# Patient Record
Sex: MALE | Race: WHITE | NOT HISPANIC OR LATINO
[De-identification: names, ages, dates, MRNs, and addresses within clinical notes are randomized per-mention and may not be internally consistent; named-entity substitution may affect disease eponyms.]

---

## 2022-06-18 RX ORDER — MELOXICAM 7.5 MG/1
TABLET ORAL
COMMUNITY

## 2022-06-18 RX ORDER — AMLODIPINE BESYLATE 5 MG/1
TABLET ORAL
COMMUNITY

## 2022-06-18 RX ORDER — ALLOPURINOL 100 MG/1
TABLET ORAL
COMMUNITY

## 2022-06-18 RX ORDER — TRIAMCINOLONE ACETONIDE 1 MG/G
CREAM TOPICAL
COMMUNITY
Start: 2013-02-19

## 2022-06-18 RX ORDER — EZETIMIBE AND SIMVASTATIN 10; 20 MG/1; MG/1
TABLET ORAL
COMMUNITY

## 2022-06-18 RX ORDER — OMEPRAZOLE 20 MG/1
1 CAPSULE, DELAYED RELEASE ORAL
COMMUNITY

## 2022-06-18 RX ORDER — OSELTAMIVIR PHOSPHATE 75 MG/1
1 CAPSULE ORAL
COMMUNITY
Start: 2019-01-29

## 2023-05-12 PROBLEM — Z00.00 ENCOUNTER FOR PREVENTIVE HEALTH EXAMINATION: Status: ACTIVE | Noted: 2023-05-12

## 2023-05-16 ENCOUNTER — NON-APPOINTMENT (OUTPATIENT)
Age: 78
End: 2023-05-16

## 2023-05-16 ENCOUNTER — APPOINTMENT (OUTPATIENT)
Dept: CARDIOLOGY | Facility: CLINIC | Age: 78
End: 2023-05-16
Payer: MEDICARE

## 2023-05-16 VITALS
DIASTOLIC BLOOD PRESSURE: 78 MMHG | HEART RATE: 67 BPM | BODY MASS INDEX: 25.2 KG/M2 | HEIGHT: 71 IN | SYSTOLIC BLOOD PRESSURE: 124 MMHG | WEIGHT: 180 LBS | OXYGEN SATURATION: 98 %

## 2023-05-16 DIAGNOSIS — Z78.9 OTHER SPECIFIED HEALTH STATUS: ICD-10-CM

## 2023-05-16 DIAGNOSIS — Z87.891 PERSONAL HISTORY OF NICOTINE DEPENDENCE: ICD-10-CM

## 2023-05-16 DIAGNOSIS — Z98.890 OTHER SPECIFIED POSTPROCEDURAL STATES: ICD-10-CM

## 2023-05-16 PROCEDURE — 93000 ELECTROCARDIOGRAM COMPLETE: CPT

## 2023-05-16 PROCEDURE — 99203 OFFICE O/P NEW LOW 30 MIN: CPT

## 2023-05-16 RX ORDER — ALLOPURINOL 100 MG/1
100 TABLET ORAL DAILY
Refills: 0 | Status: ACTIVE | COMMUNITY

## 2023-05-16 RX ORDER — DRONEDARONE 400 MG/1
400 TABLET, FILM COATED ORAL
Qty: 180 | Refills: 3 | Status: COMPLETED | COMMUNITY
End: 2023-05-16

## 2023-05-16 RX ORDER — ROSUVASTATIN CALCIUM 40 MG/1
40 TABLET, FILM COATED ORAL DAILY
Qty: 90 | Refills: 3 | Status: ACTIVE | COMMUNITY

## 2023-05-16 RX ORDER — ENALAPRIL MALEATE 10 MG/1
10 TABLET ORAL DAILY
Refills: 0 | Status: ACTIVE | COMMUNITY

## 2023-05-16 RX ORDER — AMLODIPINE BESYLATE 5 MG/1
5 TABLET ORAL
Qty: 90 | Refills: 3 | Status: ACTIVE | COMMUNITY

## 2023-05-16 RX ORDER — PANTOPRAZOLE SODIUM 40 MG/1
40 TABLET, DELAYED RELEASE ORAL
Qty: 90 | Refills: 3 | Status: ACTIVE | COMMUNITY

## 2023-05-17 NOTE — CARDIOLOGY SUMMARY
[de-identified] : 12/29/2022 atrial fibrillation with controlled VR, LAHB, PRWP, QTc 474 ms\par 5/16/2023  atrial fibrillation with controlled VR, LAHB, PRWP, cannot rule out AMI, voltage criteria for LVH [de-identified] : Adenosine MPI 7/24/2008 was normal without scintigraphic evidence of ischemia or infarction.  [de-identified] : 1/18/2006 Calcium score 223 with focus of calcified and soft plaque in the proximal LAD, approximately 50 to 70% stenosis, possible overestimation of severity due to calcification in this plaque. LAD and LCx noted to originate from a short common trunk  15 to 30% calcified stenoses noted in co-dominate LCx and proximal RCA\par \par Calcium score 223

## 2023-05-17 NOTE — REASON FOR VISIT
[FreeTextEntry3] : Doroteo Montesinos MD [FreeTextEntry1] : Ongoing cardiac care for atrial fibrillation

## 2023-05-17 NOTE — REVIEW OF SYSTEMS
[Fever] : no fever [Chills] : no chills [SOB] : no shortness of breath [Chest Discomfort] : no chest discomfort [Lower Ext Edema] : lower extremity edema [Palpitations] : no palpitations [Leg Claudication] : no intermittent leg claudication [Orthopnea] : no orthopnea [Syncope] : no syncope [Cough] : no cough [Wheezing] : no wheezing [Dizziness] : no dizziness [Weakness] : no weakness [Limb Weakness (Paresis)] : no limb weakness (Paresis) [Speech Disturbance] : no speech disturbance [Confusion] : no confusion was observed [Negative] : Eyes

## 2023-05-17 NOTE — ASSESSMENT
[FreeTextEntry1] : 77 yo man with recurrence of atrial fibrillation post antiarrhythmic therapy with dronedarone and cardioversion.  He has mild limitation and symptoms associated with AF. His Chadsvasc2 score is elevated at 4 and he continues on a NOAC. He would like to consider further attempts to maintain sinus rhythm including possible ablation.

## 2023-05-17 NOTE — PHYSICAL EXAM
[Well Developed] : well developed [Well Nourished] : well nourished [No Acute Distress] : no acute distress [Normal Conjunctiva] : normal conjunctiva [Normal Venous Pressure] : normal venous pressure [No Carotid Bruit] : no carotid bruit [Normal S1, S2] : normal S1, S2 [No Murmur] : no murmur [No Rub] : no rub [No Gallop] : no gallop [Soft] : abdomen soft [Non Tender] : non-tender [No Masses/organomegaly] : no masses/organomegaly [Normal Bowel Sounds] : normal bowel sounds [Normal Gait] : normal gait [No Cyanosis] : no cyanosis [No Clubbing] : no clubbing [Normal Radial B/L] : normal radial B/L [Normal PT B/L] : normal PT B/L [Moves all extremities] : moves all extremities [No Focal Deficits] : no focal deficits [Normal Speech] : normal speech [de-identified] : Mild 1+ ankle edema

## 2023-05-17 NOTE — CARDIOLOGY SUMMARY
[de-identified] : 12/29/2022 atrial fibrillation with controlled VR, LAHB, PRWP, QTc 474 ms\par 5/16/2023  atrial fibrillation with controlled VR, LAHB, PRWP, cannot rule out AMI, voltage criteria for LVH [de-identified] : Adenosine MPI 7/24/2008 was normal without scintigraphic evidence of ischemia or infarction.  [de-identified] : 1/18/2006 Calcium score 223 with focus of calcified and soft plaque in the proximal LAD, approximately 50 to 70% stenosis, possible overestimation of severity due to calcification in this plaque. LAD and LCx noted to originate from a short common trunk  15 to 30% calcified stenoses noted in co-dominate LCx and proximal RCA\par \par Calcium score 223

## 2023-05-17 NOTE — HISTORY OF PRESENT ILLNESS
[FreeTextEntry1] : Yfn Dahl is a 77 yo man referred for evaluation and management of recurrent symptomatic atrial fibrillation.  Atrial fibrillation with a controlled ventricular response was first discovered 12/29/2022 at the time of his annual physical.  Echocardiography at the time and the previous year demonstrated normal biventricular systolic function, moderate left atrial enlargement, mild right atrial enlargement, moderate diastolic dysfunction, mild to moderate mitral regurgitation and aortic sclerosis without stenosis.   Apixaban 5 mg twice daily was initiated prior to departing for the winter in [place]. There he consulted an electrophysiologist where various options including rhythm and rate control and ablation were reviewed.  He elected for a trial with cardioversion.  Apixaban was changed to rivaroxaban 20 mg daily for cost reasons and dronedarone 400 mg twice daily was begun followed in several weeks on 2/16/23 by cardioversion.  EKG post cardioversion showed sinus rhythm with first-degree AV block, PACs, left anterior hemiblock and poor R wave progression. Several weeks ago he felt his heart rate was irregular associated with decreased stamina.  He remains active playing golf and doubles tennis but with more difficulty and mild dyspnea.  He denies lightheadedness,  rapid heart rate, presyncope, syncope, orthopnea, PND and chest discomfort. There is no h/o MI or RHD.  He has hypertension and hyperlipidemia.  He does not have diabetes and he does not smoke.  He drinks a moderate amount of alcohol. There is no h/o OMI.

## 2023-05-17 NOTE — DISCUSSION/SUMMARY
[FreeTextEntry1] : 1. Continue present Rx except stop dronedarone that was unsuccessful in maintaining sinus rhythm.  Trial with low dose beta blocker.. Previously therapy included metoprolol succinate.\par 2. Referral to electrophysiology, Dr Tyler Georges for consideration of additional drug therapy versus ablation.\par 3. We reviewed the importance of a healthy lifestyle including: maintenance of normal body weight, regular physical activity with 30 to 45 minutes of exercise most days of the week, dietary modification with increased fruits and vegetables, whole grain products, low-fat dairy products, fish, reduced saturated and total fat intake and restriction of alcohol and salt intake.\par  [EKG obtained to assist in diagnosis and management of assessed problem(s)] : EKG obtained to assist in diagnosis and management of assessed problem(s)

## 2023-05-17 NOTE — ASSESSMENT
[FreeTextEntry1] : 79 yo man with recurrence of atrial fibrillation post antiarrhythmic therapy with dronedarone and cardioversion.  He has mild limitation and symptoms associated with AF. His Chadsvasc2 score is elevated at 4 and he continues on a NOAC. He would like to consider further attempts to maintain sinus rhythm including possible ablation.

## 2023-05-17 NOTE — PHYSICAL EXAM
[Well Developed] : well developed [Well Nourished] : well nourished [No Acute Distress] : no acute distress [Normal Conjunctiva] : normal conjunctiva [Normal Venous Pressure] : normal venous pressure [No Carotid Bruit] : no carotid bruit [Normal S1, S2] : normal S1, S2 [No Murmur] : no murmur [No Rub] : no rub [No Gallop] : no gallop [Soft] : abdomen soft [Non Tender] : non-tender [No Masses/organomegaly] : no masses/organomegaly [Normal Bowel Sounds] : normal bowel sounds [Normal Gait] : normal gait [No Cyanosis] : no cyanosis [No Clubbing] : no clubbing [Normal Radial B/L] : normal radial B/L [Normal PT B/L] : normal PT B/L [Moves all extremities] : moves all extremities [No Focal Deficits] : no focal deficits [Normal Speech] : normal speech [de-identified] : Mild 1+ ankle edema

## 2023-06-27 ENCOUNTER — NON-APPOINTMENT (OUTPATIENT)
Age: 78
End: 2023-06-27

## 2023-06-30 ENCOUNTER — TRANSCRIPTION ENCOUNTER (OUTPATIENT)
Age: 78
End: 2023-06-30

## 2023-07-07 ENCOUNTER — APPOINTMENT (OUTPATIENT)
Dept: ELECTROPHYSIOLOGY | Facility: CLINIC | Age: 78
End: 2023-07-07
Payer: MEDICARE

## 2023-07-07 VITALS
DIASTOLIC BLOOD PRESSURE: 70 MMHG | BODY MASS INDEX: 25.9 KG/M2 | SYSTOLIC BLOOD PRESSURE: 120 MMHG | HEIGHT: 71 IN | WEIGHT: 185 LBS | OXYGEN SATURATION: 97 % | HEART RATE: 82 BPM

## 2023-07-07 PROCEDURE — 93000 ELECTROCARDIOGRAM COMPLETE: CPT

## 2023-07-07 PROCEDURE — 99204 OFFICE O/P NEW MOD 45 MIN: CPT

## 2023-07-09 ENCOUNTER — NON-APPOINTMENT (OUTPATIENT)
Age: 78
End: 2023-07-09

## 2023-07-09 NOTE — REVIEW OF SYSTEMS
[Feeling Fatigued] : feeling fatigued [Fever] : no fever [SOB] : no shortness of breath [Dyspnea on exertion] : not dyspnea during exertion [Chest Discomfort] : no chest discomfort [Lower Ext Edema] : no extremity edema [Orthopnea] : no orthopnea [Cough] : no cough [Abdominal Pain] : no abdominal pain [Dizziness] : no dizziness [Under Stress] : not under stress [Easy Bleeding] : no tendency for easy bleeding

## 2023-07-09 NOTE — DISCUSSION/SUMMARY
[FreeTextEntry1] : Impression\par Symptomatic persistent atrial fibrillation.\par \par Prior echo which showed moderate left atrial enlargement with mild to moderate MR.\par \par Risk factors include hypertension.  \par [id number] score 4\par \par \par We discussed the options and recommend restoration of sinus rhythm since he is symptomatic.  Option of antiarrhythmic versus catheter ablation discussed.  He has had persistent atrial fibrillation for more than a year.  Success rate of antiarrhythmic versus ablation discussed.  Given his prior coronary disease showing significant arrhythmia and limited remaining amiodarone.  Patient prefer not to take medication would consider catheter ablation procedure.\par \par Ablation procedure including risks, success rate, recurrence rate, redo rates were all discussed.\par \par We will get a follow-up echo to reassess extent of his MR and chamber size before making a final decision.  We will also discuss with Dr. Cornelius Maravilla the role for follow-up CT scan to reassess the LAD lesion previously seen in 2006.\par \par Plan: Continue on beta-blocker and anticoagulation for now.  Obtain follow-up echo and then decide on catheter ablation.  Discuss coronary assessment. [EKG obtained to assist in diagnosis and management of assessed problem(s)] : EKG obtained to assist in diagnosis and management of assessed problem(s)

## 2023-07-09 NOTE — DISCUSSION/SUMMARY
[FreeTextEntry1] : Impression\par Symptomatic persistent atrial fibrillation.\par \par Prior echo which showed moderate left atrial enlargement with mild to moderate MR.\par \par Risk factors include hypertension.  \par [id number] score 4\par \par \par We discussed the options and recommend restoration of sinus rhythm since he is symptomatic.  Option of antiarrhythmic versus catheter ablation discussed.  He has had persistent atrial fibrillation for more than a year.  Success rate of antiarrhythmic versus ablation discussed.  Given his prior coronary disease showing significant arrhythmia and limited remaining amiodarone.  Patient prefer not to take medication would consider catheter ablation procedure.\par \par Ablation procedure including risks, success rate, recurrence rate, redo rates were all discussed.\par \par We will get a follow-up echo to reassess extent of his MR and chamber size before making a final decision.  We will also discuss with Dr. Cornelius Maravilla the role for follow-up CT scan to reassess the LAD lesion previously seen in 2006.\par \par Plan: Continue on beta-blocker and anticoagulation for now.  Obtain follow-up echo and then decide on catheter ablation.  Discuss coronary assessment. [EKG obtained to assist in diagnosis and management of assessed problem(s)] : EKG obtained to assist in diagnosis and management of assessed problem(s)

## 2023-07-09 NOTE — HISTORY OF PRESENT ILLNESS
[FreeTextEntry1] : Cardiologist: Dr. Cornelius Maravilla\par \par Patient is a 78-year-old man who is seen in evaluation regarding atrial fibrillation.\par \par He first paroxysmal atrial fibrillation 2021.  At time of routine physical exam 12/2022 AF was again noted.  He was not aware of A-fib then.   He was started on apixaban.  He had some decrease in stamina and was persistent and started on anticoagulation.  He was started on Multaq 400 twice daily and had electrical cardioversion done 2/16/2023.  He had electrical cardioversion and was treated with Multaq 400 twice daily.  He felt better after cardioversion and did well for several weeks until he redeveloped atrial fibrillation with sensation of irregular beat and decrease in stamina.\par The patient has a history of hypertension but no diabetes.  He was never tested for sleep apnea.  Drinks about 4 alcoholic beverage beer–wine per day\par \par He denies chest pain, shortness of breath, dizziness, lightheadedness, syncope or presyncope.  Patient has been very active and plays golf walking\par \par Echocardiogram from 12/27/2021: Normal ventricular function.  Left atrium is moderately dilated.  Mild to moderate mitral regurgitation.\par \par \par CT of the chest with contrast 2006: Approximately 50-70% stenosis.\par

## 2023-07-09 NOTE — CARDIOLOGY SUMMARY
[de-identified] : Atrial fibrillation  -Left axis -anterior fascicular block.   -Poor R-wave progression

## 2023-07-09 NOTE — PHYSICAL EXAM
[No Acute Distress] : no acute distress [Normal Venous Pressure] : normal venous pressure [Normal S1, S2] : normal S1, S2 [Clear Lung Fields] : clear lung fields [Non Tender] : non-tender [Normal Gait] : normal gait [No Edema] : no edema [No Focal Deficits] : no focal deficits [Alert and Oriented] : alert and oriented

## 2023-07-09 NOTE — CARDIOLOGY SUMMARY
[de-identified] : Atrial fibrillation  -Left axis -anterior fascicular block.   -Poor R-wave progression

## 2023-07-20 ENCOUNTER — APPOINTMENT (OUTPATIENT)
Dept: CARDIOLOGY | Facility: CLINIC | Age: 78
End: 2023-07-20
Payer: MEDICARE

## 2023-07-20 PROCEDURE — 93306 TTE W/DOPPLER COMPLETE: CPT

## 2023-08-14 ENCOUNTER — NON-APPOINTMENT (OUTPATIENT)
Age: 78
End: 2023-08-14

## 2023-08-21 ENCOUNTER — APPOINTMENT (OUTPATIENT)
Dept: CARDIOLOGY | Facility: CLINIC | Age: 78
End: 2023-08-21
Payer: MEDICARE

## 2023-08-21 DIAGNOSIS — I44.0 ATRIOVENTRICULAR BLOCK, FIRST DEGREE: ICD-10-CM

## 2023-08-21 DIAGNOSIS — I25.84 ATHEROSCLEROTIC HEART DISEASE OF NATIVE CORONARY ARTERY W/OUT ANGINA PECTORIS: ICD-10-CM

## 2023-08-21 DIAGNOSIS — I25.10 ATHEROSCLEROTIC HEART DISEASE OF NATIVE CORONARY ARTERY W/OUT ANGINA PECTORIS: ICD-10-CM

## 2023-08-21 PROCEDURE — 93015 CV STRESS TEST SUPVJ I&R: CPT

## 2023-08-22 ENCOUNTER — APPOINTMENT (OUTPATIENT)
Dept: CARDIOLOGY | Facility: CLINIC | Age: 78
End: 2023-08-22
Payer: MEDICARE

## 2023-08-22 VITALS
WEIGHT: 185 LBS | BODY MASS INDEX: 25.9 KG/M2 | SYSTOLIC BLOOD PRESSURE: 112 MMHG | OXYGEN SATURATION: 95 % | HEART RATE: 83 BPM | HEIGHT: 71 IN | DIASTOLIC BLOOD PRESSURE: 70 MMHG

## 2023-08-22 PROCEDURE — 99213 OFFICE O/P EST LOW 20 MIN: CPT

## 2023-08-22 NOTE — DISCUSSION/SUMMARY
[FreeTextEntry1] : 1.  The findings of the cardiogram and stress test were reviewed with the patient and his wife.  Plans are to continue present Rx including low dose beta blocker.. 2.  He has follow-up with electrophysiology, Dr Tyler Georges on 8/30/2023 and will again review the possibility of an ablation.  3.  He is aware of the importance of a healthy lifestyle including: maintenance of normal body weight, regular physical activity with 30 to 45 minutes of exercise most days of the week, dietary modification with increased fruits and vegetables, whole grain products, low-fat dairy products, fish, reduced saturated and total fat intake and restriction of alcohol and salt intake. 4.  Cardiology follow-up here with a repeat echo will be in the spring after he returns from [place] or sooner prn for any significant change in his symptomatology such as persistent rapid heart rate, presyncope significant shortness of breath or chest pain.

## 2023-08-22 NOTE — CARDIOLOGY SUMMARY
[de-identified] : 12/29/2022 atrial fibrillation with controlled VR, LAHB, PRWP, QTc 474 ms\par  5/16/2023  atrial fibrillation with controlled VR, LAHB, PRWP, cannot rule out AMI, voltage criteria for LVH [de-identified] : 8/21/2023 Conclusions: 1. The patient underwent stress testing using the standard [facility] protocol. _ The patient exercised for 7 min 23 sec. _ The test was stopped due to target heart rate achieved, patient request and maximum exertion effort. _ The peak heart rate was 132 bpm; 93 % of predicted maximal heart rate for this patient. _ The patient achieved 8.9 METS which is consistent with average exercise capacity. 2. Baseline electrocardiogram: atrial fibrillation at a rate of 55 bpm. 3. Stress electrocardiogram: No significant ischemic ST segment changes. 4. Arrhythmias:. V triplet at peak exercise. PVCs during stress and recovery. 5. The Duke Treadmill Score is 7.00, which is consistent with low risk (=5) for future cardiac events. 6. The ECG is negative for ischemia. [de-identified] : 7/20/2023 . The left ventricular systolic function is normal with an ejection fraction visually estimated at 55 to 60 %. 2. Sigmoid septum measuring 1.4 cm. 3. The left ventricular diastolic function is indeterminate. 4. The left atrium is moderately dilated in size. 5. The right atrium is mildly dilated in size. 6. Moderate mitral regurgitation. 7. Global longitudinal strain performed on a vivid E90. Average GLS=-15%. [place] and average HR unavailble due to varying R to R intervals in the setting of A-fib. 8. Mild pulmonic regurgitation. 9. No prior echocardiogram is available for comparison. [de-identified] : 1/18/2006 Calcium score 223 with focus of calcified and soft plaque in the proximal LAD, approximately 50 to 70% stenosis, possible overestimation of severity due to calcification in this plaque. LAD and LCx noted to originate from a short common trunk  15 to 30% calcified stenoses noted in co-dominate LCx and proximal RCA\par  \par  Calcium score 223 [de-identified] : Adenosine MPI 7/24/2008 was normal without scintigraphic evidence of ischemia or infarction.

## 2023-08-22 NOTE — HISTORY OF PRESENT ILLNESS
[FreeTextEntry1] : Yfn Dahl is a 79 yo man referred for evaluation and management of recurrent symptomatic atrial fibrillation.  Atrial fibrillation with a controlled ventricular response was first discovered 12/29/2022 at the time of his annual physical.  Echocardiography at the time and the previous year demonstrated normal biventricular systolic function, moderate left atrial enlargement, mild right atrial enlargement, moderate diastolic dysfunction, mild to moderate mitral regurgitation and aortic sclerosis without stenosis.   Apixaban 5 mg twice daily was initiated prior to departing for the winter in [place]. There he consulted an electrophysiologist where various options including rhythm and rate control and ablation were reviewed.  He elected for a trial with cardioversion.  Apixaban was changed to rivaroxaban 20 mg daily for cost reasons and dronedarone 400 mg twice daily was begun followed in several weeks on 2/16/23 by cardioversion.  EKG post cardioversion showed sinus rhythm with first-degree AV block, PACs, left anterior hemiblock and poor R wave progression. Several weeks ago he felt his heart rate was irregular associated with decreased stamina.  He remains active playing golf and doubles tennis but with more difficulty and mild dyspnea.  He denies lightheadedness,  rapid heart rate, presyncope, syncope, orthopnea, PND and chest discomfort. There is no h/o MI or RHD.  He has hypertension and hyperlipidemia.  He does not have diabetes and he does not smoke.  He drinks a moderate amount of alcohol. There is no h/o OMI.   He remains active playing 18 holes of golf, doubles tennis etc.  These activities are well-tolerated without chest discomfort or shortness of breath.  He is not having palpitations.  On rare occasions if he stands up quickly has mild lightheadedness.  He is not having orthopnea or PND.  ETT (see below) was negative to 8.9 METS.  His heart rate increased from 55 to a peak of 132 bpm.  His Duke treadmill score is 7 which is low risk.  There were no significant ECG changes with exercise.

## 2023-08-22 NOTE — PHYSICAL EXAM
[Well Developed] : well developed [Well Nourished] : well nourished [No Acute Distress] : no acute distress [No Edema] : no edema [Moves all extremities] : moves all extremities [No Focal Deficits] : no focal deficits [Normal Speech] : normal speech [Alert and Oriented] : alert and oriented [Normal memory] : normal memory

## 2023-08-22 NOTE — ASSESSMENT
[FreeTextEntry1] : 77 yo man with recurrence of atrial fibrillation post antiarrhythmic therapy with dronedarone and cardioversion.  He has minimal limitation symptoms associated with AF. His Chadsvasc2 score is elevated at 4 and he continues on a NOAC. He is doing well with low-dose beta-blocker.  He also has moderate mitral regurgitation which has been previously documented.

## 2023-08-30 ENCOUNTER — APPOINTMENT (OUTPATIENT)
Dept: ELECTROPHYSIOLOGY | Facility: CLINIC | Age: 78
End: 2023-08-30

## 2023-10-20 ENCOUNTER — RX RENEWAL (OUTPATIENT)
Age: 78
End: 2023-10-20

## 2023-11-13 ENCOUNTER — APPOINTMENT (OUTPATIENT)
Dept: ORTHOPEDIC SURGERY | Facility: CLINIC | Age: 78
End: 2023-11-13
Payer: MEDICARE

## 2023-11-13 VITALS
SYSTOLIC BLOOD PRESSURE: 122 MMHG | HEART RATE: 80 BPM | DIASTOLIC BLOOD PRESSURE: 70 MMHG | WEIGHT: 180 LBS | HEIGHT: 71 IN | BODY MASS INDEX: 25.2 KG/M2

## 2023-11-13 PROCEDURE — 99204 OFFICE O/P NEW MOD 45 MIN: CPT

## 2023-11-24 ENCOUNTER — APPOINTMENT (OUTPATIENT)
Dept: ELECTROPHYSIOLOGY | Facility: CLINIC | Age: 78
End: 2023-11-24
Payer: MEDICARE

## 2023-11-24 VITALS
SYSTOLIC BLOOD PRESSURE: 132 MMHG | DIASTOLIC BLOOD PRESSURE: 64 MMHG | BODY MASS INDEX: 25.2 KG/M2 | HEART RATE: 80 BPM | WEIGHT: 180 LBS | OXYGEN SATURATION: 96 % | HEIGHT: 71 IN

## 2023-11-24 DIAGNOSIS — I48.91 UNSPECIFIED ATRIAL FIBRILLATION: ICD-10-CM

## 2023-11-24 PROCEDURE — 93000 ELECTROCARDIOGRAM COMPLETE: CPT

## 2023-11-24 PROCEDURE — 99214 OFFICE O/P EST MOD 30 MIN: CPT

## 2023-11-27 ENCOUNTER — OUTPATIENT (OUTPATIENT)
Dept: OUTPATIENT SERVICES | Facility: HOSPITAL | Age: 78
LOS: 1 days | End: 2023-11-27
Payer: MEDICARE

## 2023-11-27 VITALS
WEIGHT: 179.9 LBS | HEIGHT: 71 IN | HEART RATE: 77 BPM | DIASTOLIC BLOOD PRESSURE: 70 MMHG | TEMPERATURE: 98 F | OXYGEN SATURATION: 97 % | RESPIRATION RATE: 16 BRPM | SYSTOLIC BLOOD PRESSURE: 137 MMHG

## 2023-11-27 DIAGNOSIS — Z98.49 CATARACT EXTRACTION STATUS, UNSPECIFIED EYE: Chronic | ICD-10-CM

## 2023-11-27 DIAGNOSIS — G56.02 CARPAL TUNNEL SYNDROME, LEFT UPPER LIMB: ICD-10-CM

## 2023-11-27 DIAGNOSIS — Z96.653 PRESENCE OF ARTIFICIAL KNEE JOINT, BILATERAL: Chronic | ICD-10-CM

## 2023-11-27 DIAGNOSIS — Z98.890 OTHER SPECIFIED POSTPROCEDURAL STATES: Chronic | ICD-10-CM

## 2023-11-27 LAB
ALBUMIN SERPL ELPH-MCNC: 3.6 G/DL — SIGNIFICANT CHANGE UP (ref 3.3–5)
ALBUMIN SERPL ELPH-MCNC: 3.6 G/DL — SIGNIFICANT CHANGE UP (ref 3.3–5)
ALP SERPL-CCNC: 42 U/L — SIGNIFICANT CHANGE UP (ref 30–120)
ALP SERPL-CCNC: 42 U/L — SIGNIFICANT CHANGE UP (ref 30–120)
ALT FLD-CCNC: 30 U/L — SIGNIFICANT CHANGE UP (ref 10–60)
ALT FLD-CCNC: 30 U/L — SIGNIFICANT CHANGE UP (ref 10–60)
ANION GAP SERPL CALC-SCNC: 7 MMOL/L — SIGNIFICANT CHANGE UP (ref 5–17)
ANION GAP SERPL CALC-SCNC: 7 MMOL/L — SIGNIFICANT CHANGE UP (ref 5–17)
AST SERPL-CCNC: 34 U/L — SIGNIFICANT CHANGE UP (ref 10–40)
AST SERPL-CCNC: 34 U/L — SIGNIFICANT CHANGE UP (ref 10–40)
BILIRUB SERPL-MCNC: 1.2 MG/DL — SIGNIFICANT CHANGE UP (ref 0.2–1.2)
BILIRUB SERPL-MCNC: 1.2 MG/DL — SIGNIFICANT CHANGE UP (ref 0.2–1.2)
BUN SERPL-MCNC: 21 MG/DL — SIGNIFICANT CHANGE UP (ref 7–23)
BUN SERPL-MCNC: 21 MG/DL — SIGNIFICANT CHANGE UP (ref 7–23)
CALCIUM SERPL-MCNC: 9.6 MG/DL — SIGNIFICANT CHANGE UP (ref 8.4–10.5)
CALCIUM SERPL-MCNC: 9.6 MG/DL — SIGNIFICANT CHANGE UP (ref 8.4–10.5)
CHLORIDE SERPL-SCNC: 106 MMOL/L — SIGNIFICANT CHANGE UP (ref 96–108)
CHLORIDE SERPL-SCNC: 106 MMOL/L — SIGNIFICANT CHANGE UP (ref 96–108)
CO2 SERPL-SCNC: 30 MMOL/L — SIGNIFICANT CHANGE UP (ref 22–31)
CO2 SERPL-SCNC: 30 MMOL/L — SIGNIFICANT CHANGE UP (ref 22–31)
CREAT SERPL-MCNC: 1.03 MG/DL — SIGNIFICANT CHANGE UP (ref 0.5–1.3)
CREAT SERPL-MCNC: 1.03 MG/DL — SIGNIFICANT CHANGE UP (ref 0.5–1.3)
EGFR: 74 ML/MIN/1.73M2 — SIGNIFICANT CHANGE UP
EGFR: 74 ML/MIN/1.73M2 — SIGNIFICANT CHANGE UP
GLUCOSE SERPL-MCNC: 145 MG/DL — HIGH (ref 70–99)
GLUCOSE SERPL-MCNC: 145 MG/DL — HIGH (ref 70–99)
HCT VFR BLD CALC: 39.8 % — SIGNIFICANT CHANGE UP (ref 39–50)
HCT VFR BLD CALC: 39.8 % — SIGNIFICANT CHANGE UP (ref 39–50)
HGB BLD-MCNC: 13.1 G/DL — SIGNIFICANT CHANGE UP (ref 13–17)
HGB BLD-MCNC: 13.1 G/DL — SIGNIFICANT CHANGE UP (ref 13–17)
MCHC RBC-ENTMCNC: 31.9 PG — SIGNIFICANT CHANGE UP (ref 27–34)
MCHC RBC-ENTMCNC: 31.9 PG — SIGNIFICANT CHANGE UP (ref 27–34)
MCHC RBC-ENTMCNC: 32.9 GM/DL — SIGNIFICANT CHANGE UP (ref 32–36)
MCHC RBC-ENTMCNC: 32.9 GM/DL — SIGNIFICANT CHANGE UP (ref 32–36)
MCV RBC AUTO: 96.8 FL — SIGNIFICANT CHANGE UP (ref 80–100)
MCV RBC AUTO: 96.8 FL — SIGNIFICANT CHANGE UP (ref 80–100)
NRBC # BLD: 0 /100 WBCS — SIGNIFICANT CHANGE UP (ref 0–0)
NRBC # BLD: 0 /100 WBCS — SIGNIFICANT CHANGE UP (ref 0–0)
PLATELET # BLD AUTO: 262 K/UL — SIGNIFICANT CHANGE UP (ref 150–400)
PLATELET # BLD AUTO: 262 K/UL — SIGNIFICANT CHANGE UP (ref 150–400)
POTASSIUM SERPL-MCNC: 5 MMOL/L — SIGNIFICANT CHANGE UP (ref 3.5–5.3)
POTASSIUM SERPL-MCNC: 5 MMOL/L — SIGNIFICANT CHANGE UP (ref 3.5–5.3)
POTASSIUM SERPL-SCNC: 5 MMOL/L — SIGNIFICANT CHANGE UP (ref 3.5–5.3)
POTASSIUM SERPL-SCNC: 5 MMOL/L — SIGNIFICANT CHANGE UP (ref 3.5–5.3)
PROT SERPL-MCNC: 7.2 G/DL — SIGNIFICANT CHANGE UP (ref 6–8.3)
PROT SERPL-MCNC: 7.2 G/DL — SIGNIFICANT CHANGE UP (ref 6–8.3)
RBC # BLD: 4.11 M/UL — LOW (ref 4.2–5.8)
RBC # BLD: 4.11 M/UL — LOW (ref 4.2–5.8)
RBC # FLD: 12.6 % — SIGNIFICANT CHANGE UP (ref 10.3–14.5)
RBC # FLD: 12.6 % — SIGNIFICANT CHANGE UP (ref 10.3–14.5)
SODIUM SERPL-SCNC: 143 MMOL/L — SIGNIFICANT CHANGE UP (ref 135–145)
SODIUM SERPL-SCNC: 143 MMOL/L — SIGNIFICANT CHANGE UP (ref 135–145)
WBC # BLD: 7.46 K/UL — SIGNIFICANT CHANGE UP (ref 3.8–10.5)
WBC # BLD: 7.46 K/UL — SIGNIFICANT CHANGE UP (ref 3.8–10.5)
WBC # FLD AUTO: 7.46 K/UL — SIGNIFICANT CHANGE UP (ref 3.8–10.5)
WBC # FLD AUTO: 7.46 K/UL — SIGNIFICANT CHANGE UP (ref 3.8–10.5)

## 2023-11-27 PROCEDURE — 93010 ELECTROCARDIOGRAM REPORT: CPT

## 2023-11-27 PROCEDURE — 85027 COMPLETE CBC AUTOMATED: CPT

## 2023-11-27 PROCEDURE — 93005 ELECTROCARDIOGRAM TRACING: CPT

## 2023-11-27 PROCEDURE — 80053 COMPREHEN METABOLIC PANEL: CPT

## 2023-11-27 PROCEDURE — G0463: CPT

## 2023-11-27 PROCEDURE — 36415 COLL VENOUS BLD VENIPUNCTURE: CPT

## 2023-11-27 NOTE — H&P PST ADULT - NSICDXFAMILYHX_GEN_ALL_CORE_FT
FAMILY HISTORY:  Sibling  Still living? No  Family history of subarachnoid hemorrhage, Age at diagnosis: Age Unknown  Family hx of ALS (amyotrophic lateral sclerosis), Age at diagnosis: Age Unknown

## 2023-11-27 NOTE — H&P PST ADULT - HISTORY OF PRESENT ILLNESS
77 yo male reports numbness and tingling left fingers 1 2 3 for last 6 months.  He is scheduled for left endoscopic carpal tunnel release on 12/12/2023 @ [facility].   79 yo male reports numbness and tingling left fingers 1 2 3 for last 6 months.  He is scheduled for left endoscopic carpal tunnel release on 12/12/2023 @ [facility].   77 yo male reports numbness and tingling left fingers 1 2 3 for last 6 months.  He is scheduled for left endoscopic carpal tunnel release on 12/12/2023 @ [facility].

## 2023-11-27 NOTE — H&P PST ADULT - NSICDXPASTSURGICALHX_GEN_ALL_CORE_FT
PAST SURGICAL HISTORY:  History of Achilles tendon repair     History of bilateral knee replacement     History of cataract surgery

## 2023-11-27 NOTE — H&P PST ADULT - NEGATIVE ALLERGY TYPES
FROM ADMISSION H+P:   HPI:  80 y/o M PMhx HTN, PVD, DMT2, Asthma, COPD ( on 2L home O2) c/b Chronic Hypoxemic Respiratory Failure, CABG, HLD, recent admission to [facility] Oct 2018 for hypercapnic respiratory failure c/b  cardiac arrest,  now presents to ED  w/ SOB and increased work of breathing x 1 day.  Per patient  he woke up with difficulty breathing, wife at that time took his blood pressure and the systolic was int he 200s, she gave him albuterol treatment but he continued to have Shortness of breath similar to asthma attacks.  Pt also states he has been unable to sleep with the home BIPAP mask, waking up the in the middle of the night and taking it off in attempt to sleep. Both nights he was unable to go back to sleep and has been very tired the past two days due to lack of sleep. Pt has been consistently using his 2L home O2 during the daytime and reports LA with climbing stairs, which is chronic for him. Of note, his two brothers  in the past month and he [place] 2-3 weeks ago. He currently states his breathing is much more comfortable on the CPAP.  He denies fever, chills, n/V,abdominal pain, dysuria, palpitations, cough, congestion, myalgias, sick contacts, chest pain,      In ED,  VS  T 98, , /93, RR 20, Sat 94% on 2L NC  EKG- Sinus Tach 100 with fusion complexes, no ST changes  ABG - ( 21 Dec 2018 19:46 )  pH, Arterial: 7.30  pH, Blood: x     /  pCO2: 64    /  pO2: 190   / HCO3: 27    / Base Excess: 4.0   /  SaO2: 100     ABG revealed Acute on Chronic respiratory acidosis and pt was placed on BPAP. Then changed to CPAP 5 TV >400 by ICU PA  Labs: WBC shows Eos% 10 , Procal 0.05, CO2 33, Glu 181, Trop neg x 1, UA protein and hyalin casts  Flu AB aand RSV neg  CXR - hyper inflation, No infiltrate , similar to 2018  Received albuterol, solumedrol 40 IV, lasix 40 IV , Azithromycin, sublingual Nitroglycerin.    He was placed on BiPap then CPAP after ICU consulted. (21 Dec 2018 23:39)      ---  HOSPITAL COURSE:   Admitted w/ acute/chronic resp failure w/ hypoxia and hypercapnea. Pt tolerated BiPAP/CPAP well without any further acute events. Pt was gradually weaned to nasal cannula/room air and tolerated this regimen well. Aggressive IV steroid taper to po regimen. IV azithromycin 3d course of 500mg q daily. D/c home w/ po steroids with close outpatient follow up with Pulm and PCP. Pt/spouse in agreement. Leukocytosis developed due to steroid administration. Remained afebrile throughout hospitalization.     ---  CONSULTANTS:   ICU ([place])  Pulm ([place])    ---  TIME SPENT:  The total amount of time spent reviewing the hospital notes, laboratory values, imaging findings, assessing/counseling the patient, discussing with consultant physicians, social work, nursing staff took -- minutes    ---  FINAL DISCHARGE DIAGNOSIS LIST:  Please see last daily progress note for final discharge diagnoses    ---  Primary care provider was made aware of plan for discharge:      [  ] NO     [  ] YES no outdoor environmental allergies/no indoor environmental allergies/no reactions to medicines/no reactions to food

## 2023-11-27 NOTE — H&P PST ADULT - NSICDXPASTMEDICALHX_GEN_ALL_CORE_FT
PAST MEDICAL HISTORY:  Carpal tunnel syndrome, left     Chronic atrial fibrillation     GERD (gastroesophageal reflux disease)     Gout     Hypercholesteremia     Hypertension

## 2023-11-27 NOTE — H&P PST ADULT - PROBLEM SELECTOR PLAN 1
Left endoscopic carpal tunnel release is planned for 12/12/2023  Diagnostic testing performed  Medical and cardiac clearances requested   Patient to obtain instructions for xarelto  Pre op instructions were reviewed  best wishes offered

## 2023-11-27 NOTE — H&P PST ADULT - NSANTHOSAYNRD_GEN_A_CORE
No. OMI screening performed.  STOP BANG Legend: 0-2 = LOW Risk; 3-4 = INTERMEDIATE Risk; 5-8 = HIGH Risk

## 2023-11-28 ENCOUNTER — APPOINTMENT (OUTPATIENT)
Dept: CARDIOLOGY | Facility: CLINIC | Age: 78
End: 2023-11-28
Payer: MEDICARE

## 2023-11-28 ENCOUNTER — NON-APPOINTMENT (OUTPATIENT)
Age: 78
End: 2023-11-28

## 2023-11-28 VITALS
BODY MASS INDEX: 25.66 KG/M2 | DIASTOLIC BLOOD PRESSURE: 72 MMHG | OXYGEN SATURATION: 98 % | HEART RATE: 68 BPM | SYSTOLIC BLOOD PRESSURE: 104 MMHG | WEIGHT: 184 LBS

## 2023-11-28 DIAGNOSIS — G56.03 CARPAL TUNNEL SYNDROM,BILATERAL UPPER LIMBS: ICD-10-CM

## 2023-11-28 DIAGNOSIS — E78.5 HYPERLIPIDEMIA, UNSPECIFIED: ICD-10-CM

## 2023-11-28 DIAGNOSIS — I10 ESSENTIAL (PRIMARY) HYPERTENSION: ICD-10-CM

## 2023-11-28 DIAGNOSIS — G56.02 CARPAL TUNNEL SYNDROME, LEFT UPPER LIMB: ICD-10-CM

## 2023-11-28 DIAGNOSIS — I34.0 NONRHEUMATIC MITRAL (VALVE) INSUFFICIENCY: ICD-10-CM

## 2023-11-28 PROCEDURE — 93000 ELECTROCARDIOGRAM COMPLETE: CPT

## 2023-11-28 PROCEDURE — 99213 OFFICE O/P EST LOW 20 MIN: CPT | Mod: 25

## 2023-11-28 RX ORDER — RIVAROXABAN 20 MG/1
20 TABLET, FILM COATED ORAL
Qty: 90 | Refills: 3 | Status: ACTIVE | COMMUNITY
Start: 1900-01-01 | End: 1900-01-01

## 2023-12-01 PROBLEM — I48.91 AFIB: Status: ACTIVE | Noted: 2023-05-16

## 2023-12-04 ENCOUNTER — NON-APPOINTMENT (OUTPATIENT)
Age: 78
End: 2023-12-04

## 2023-12-11 ENCOUNTER — TRANSCRIPTION ENCOUNTER (OUTPATIENT)
Age: 78
End: 2023-12-11

## 2023-12-11 PROBLEM — M10.9 GOUT, UNSPECIFIED: Chronic | Status: ACTIVE | Noted: 2023-11-27

## 2023-12-11 PROBLEM — E78.00 PURE HYPERCHOLESTEROLEMIA, UNSPECIFIED: Chronic | Status: ACTIVE | Noted: 2023-11-27

## 2023-12-11 PROBLEM — G56.02 CARPAL TUNNEL SYNDROME, LEFT UPPER LIMB: Chronic | Status: ACTIVE | Noted: 2023-11-27

## 2023-12-11 PROBLEM — K21.9 GASTRO-ESOPHAGEAL REFLUX DISEASE WITHOUT ESOPHAGITIS: Chronic | Status: ACTIVE | Noted: 2023-11-27

## 2023-12-11 PROBLEM — I48.20 CHRONIC ATRIAL FIBRILLATION, UNSPECIFIED: Chronic | Status: ACTIVE | Noted: 2023-11-27

## 2023-12-11 PROBLEM — I10 ESSENTIAL (PRIMARY) HYPERTENSION: Chronic | Status: ACTIVE | Noted: 2023-11-27

## 2023-12-12 ENCOUNTER — APPOINTMENT (OUTPATIENT)
Dept: ORTHOPEDIC SURGERY | Facility: HOSPITAL | Age: 78
End: 2023-12-12

## 2023-12-12 ENCOUNTER — TRANSCRIPTION ENCOUNTER (OUTPATIENT)
Age: 78
End: 2023-12-12

## 2023-12-12 ENCOUNTER — OUTPATIENT (OUTPATIENT)
Dept: OUTPATIENT SERVICES | Facility: HOSPITAL | Age: 78
LOS: 1 days | End: 2023-12-12
Payer: MEDICARE

## 2023-12-12 VITALS
SYSTOLIC BLOOD PRESSURE: 135 MMHG | HEIGHT: 71 IN | TEMPERATURE: 97 F | HEART RATE: 74 BPM | WEIGHT: 179.68 LBS | OXYGEN SATURATION: 99 % | DIASTOLIC BLOOD PRESSURE: 75 MMHG | RESPIRATION RATE: 15 BRPM

## 2023-12-12 VITALS
OXYGEN SATURATION: 100 % | SYSTOLIC BLOOD PRESSURE: 122 MMHG | TEMPERATURE: 97 F | RESPIRATION RATE: 19 BRPM | HEART RATE: 78 BPM | DIASTOLIC BLOOD PRESSURE: 69 MMHG

## 2023-12-12 DIAGNOSIS — Z98.890 OTHER SPECIFIED POSTPROCEDURAL STATES: Chronic | ICD-10-CM

## 2023-12-12 DIAGNOSIS — Z98.49 CATARACT EXTRACTION STATUS, UNSPECIFIED EYE: Chronic | ICD-10-CM

## 2023-12-12 DIAGNOSIS — Z96.653 PRESENCE OF ARTIFICIAL KNEE JOINT, BILATERAL: Chronic | ICD-10-CM

## 2023-12-12 DIAGNOSIS — G56.02 CARPAL TUNNEL SYNDROME, LEFT UPPER LIMB: ICD-10-CM

## 2023-12-12 PROBLEM — G56.01 CARPAL TUNNEL SYNDROME OF RIGHT WRIST: Status: ACTIVE | Noted: 2023-11-13

## 2023-12-12 PROCEDURE — 29848 WRIST ENDOSCOPY/SURGERY: CPT | Mod: LT

## 2023-12-12 RX ORDER — AMLODIPINE BESYLATE 2.5 MG/1
1 TABLET ORAL
Refills: 0 | DISCHARGE

## 2023-12-12 RX ORDER — APREPITANT 80 MG/1
40 CAPSULE ORAL ONCE
Refills: 0 | Status: COMPLETED | OUTPATIENT
Start: 2023-12-12 | End: 2023-12-12

## 2023-12-12 RX ORDER — CEFAZOLIN SODIUM 1 G
2000 VIAL (EA) INJECTION ONCE
Refills: 0 | Status: COMPLETED | OUTPATIENT
Start: 2023-12-12 | End: 2023-12-12

## 2023-12-12 RX ORDER — HYDROMORPHONE HYDROCHLORIDE 2 MG/ML
0.5 INJECTION INTRAMUSCULAR; INTRAVENOUS; SUBCUTANEOUS
Refills: 0 | Status: DISCONTINUED | OUTPATIENT
Start: 2023-12-12 | End: 2023-12-12

## 2023-12-12 RX ORDER — ONDANSETRON 8 MG/1
4 TABLET, FILM COATED ORAL ONCE
Refills: 0 | Status: DISCONTINUED | OUTPATIENT
Start: 2023-12-12 | End: 2023-12-12

## 2023-12-12 RX ORDER — CHLORHEXIDINE GLUCONATE 213 G/1000ML
1 SOLUTION TOPICAL ONCE
Refills: 0 | Status: COMPLETED | OUTPATIENT
Start: 2023-12-12 | End: 2023-12-12

## 2023-12-12 RX ORDER — SODIUM CHLORIDE 9 MG/ML
1000 INJECTION, SOLUTION INTRAVENOUS
Refills: 0 | Status: DISCONTINUED | OUTPATIENT
Start: 2023-12-12 | End: 2023-12-12

## 2023-12-12 RX ORDER — OXYCODONE HYDROCHLORIDE 5 MG/1
5 TABLET ORAL ONCE
Refills: 0 | Status: DISCONTINUED | OUTPATIENT
Start: 2023-12-12 | End: 2023-12-12

## 2023-12-12 RX ORDER — ROSUVASTATIN CALCIUM 5 MG/1
1 TABLET ORAL
Refills: 0 | DISCHARGE

## 2023-12-12 RX ORDER — METOPROLOL TARTRATE 50 MG
1 TABLET ORAL
Refills: 0 | DISCHARGE

## 2023-12-12 RX ORDER — ALLOPURINOL 300 MG
0 TABLET ORAL
Refills: 0 | DISCHARGE

## 2023-12-12 RX ADMIN — CHLORHEXIDINE GLUCONATE 1 APPLICATION(S): 213 SOLUTION TOPICAL at 07:43

## 2023-12-12 RX ADMIN — APREPITANT 40 MILLIGRAM(S): 80 CAPSULE ORAL at 07:44

## 2023-12-12 NOTE — ASU DISCHARGE PLAN (ADULT/PEDIATRIC) - PROVIDER TOKENS
PROVIDER:[TOKEN:[[id number]],SCHEDULEDAPPT:[12/18/2023]] PROVIDER:[TOKEN:[[id number]],SCHEDULEDAPPT:[12/18/2023]]

## 2023-12-12 NOTE — ASU PATIENT PROFILE, ADULT - FALL HARM RISK - UNIVERSAL INTERVENTIONS
Bed in lowest position, wheels locked, appropriate side rails in place/Call bell, personal items and telephone in reach/Instruct patient to call for assistance before getting out of bed or chair/Non-slip footwear when patient is out of bed/[place] to call system/Physically safe environment - no spills, clutter or unnecessary equipment/Purposeful Proactive Rounding/Room/bathroom lighting operational, light cord in reach Bed in lowest position, wheels locked, appropriate side rails in place/Call bell, personal items and telephone in reach/Instruct patient to call for assistance before getting out of bed or chair/Non-slip footwear when patient is out of bed/[place] to call system/Physically safe environment - no spills, clutter or unnecessary equipment/Purposeful Proactive Rounding/Room/bathroom lighting operational, light cord in reach

## 2023-12-12 NOTE — ASU DISCHARGE PLAN (ADULT/PEDIATRIC) - NS MD DC FALL RISK RISK
For information on Fall & Injury Prevention, visit: [email or web address] OR  [email or web address] OR  https://www.cdc.gov/steadi/patient.html For information on Fall & Injury Prevention, visit: [email or web address] OR  [email or web address] OR  https://www.cdc.gov/steadi/patient.html

## 2023-12-12 NOTE — ASU DISCHARGE PLAN (ADULT/PEDIATRIC) - ASU DC SPECIAL INSTRUCTIONSFT
DO NOT wet or remove the dressing.  Elevate the extremity to reduce swelling.  No strenuous activities or heavy lifting.  Sling for comfort.  Resume Xarelto on 12/13/23 in the evening.    Please follow Dr. Cameron's instructions.    Follow up in the office on 12/18/23 in the [place] office.  Please call to confirm the appointment. DO NOT wet or remove the dressing.  Elevate the extremity to reduce swelling.  No strenuous activities or heavy lifting.  Sling for comfort.  Resume Xarelto on 12/13/23 in the evening.    Please follow Dr. Cameron's instructions.    Follow up in the office on 12/18/23 in the [place] office.  Please call to confirm the appointment. DO NOT wet or remove the dressing.  Elevate the extremity to reduce swelling.  No strenuous activities or heavy lifting.  Sling for comfort.  Resume Xarelto on 12/13/23 in the evening.  For pain take Tylenol Extra Strength according to the instructions on the bottle.    Please follow Dr. Cameron's instructions.    Follow up in the office on 12/18/23 in the [place] office.  Please call to confirm the appointment. DO NOT wet or remove the dressing.  Elevate the extremity to reduce swelling.  No strenuous activities or heavy lifting.  Sling for comfort.  Resume Xarelto on 12/13/23 in the evening.  For pain take Tylenol Extra Strength according to the instructions on the bottle.    Please follow Dr. Cameron's instructions.    Follow up in the office on 12/18/23 in the [place] office.  Please call to confirm the appointment.

## 2023-12-12 NOTE — ASU PREOP CHECKLIST - ADVANCE DIRECTIVE ADDRESSED/READDRESSED
Problem: Stroke (Ischemic) (Adult)  Goal: Signs and Symptoms of Listed Potential Problems Will be Absent or Manageable (Stroke)  Outcome: Ongoing (interventions implemented as appropriate)    10/15/17 1747   Stroke (Ischemic)   Problems Assessed (Stroke (Ischemic)/TIA) all   Problems Present (Stroke (Ischemic)/TIA) motor/sensory impairment;situational response         Problem: Fall Risk (Adult)  Goal: Identify Related Risk Factors and Signs and Symptoms  Outcome: Ongoing (interventions implemented as appropriate)    10/15/17 1747   Fall Risk   Fall Risk: Related Risk Factors age-related changes;environment unfamiliar   Fall Risk: Signs and Symptoms presence of risk factors       Goal: Absence of Falls  Outcome: Ongoing (interventions implemented as appropriate)    10/18/17 0536   Fall Risk (Adult)   Absence of Falls making progress toward outcome         Problem: Patient Care Overview (Adult)  Goal: Plan of Care Review  Outcome: Ongoing (interventions implemented as appropriate)    10/17/17 0350 10/18/17 0841 10/18/17 1348   Coping/Psychosocial Response Interventions   Plan Of Care Reviewed With --  patient --    Patient Care Overview   Progress improving --  --    Outcome Evaluation   Outcome Summary/Follow up Plan --  --  pt vitals stable this am. still has left side weakness. Pain in right upper leg. pain meds given as order by MD. Will be d/c to rehab today        Goal: Adult Individualization and Mutuality  Outcome: Ongoing (interventions implemented as appropriate)    10/18/17 1348   Individualization   Patient Specific Preferences pt wanting to go home    Patient Specific Goals pain control, improve swallow, no falls, vitals stable    Patient Specific Interventions give pain meds per MD orders, bed alarm, no straws         Problem: Pain, Acute (Adult)  Goal: Identify Related Risk Factors and Signs and Symptoms  Outcome: Ongoing (interventions implemented as appropriate)    10/18/17 1348   Pain, Acute    Related Risk Factors (Acute Pain) persistent pain   Signs and Symptoms (Acute Pain) sleep pattern alteration       Goal: Acceptable Pain Control/Comfort Level  Outcome: Ongoing (interventions implemented as appropriate)    10/18/17 5588   Pain, Acute (Adult)   Acceptable Pain Control/Comfort Level making progress toward outcome            done

## 2023-12-12 NOTE — ASU DISCHARGE PLAN (ADULT/PEDIATRIC) - ELEVATE EXTREMITY DURATION
Patient called asking which type of surgery is he having for his hernia and the reason why. Please call him at [phone] and is ok to leave a message.   Until cleared by Dr. Cameron.

## 2023-12-12 NOTE — ASU DISCHARGE PLAN (ADULT/PEDIATRIC) - ACTIVITY LEVEL
No exercise/No heavy lifting/No sports/gym/No weight bearing/Weight bearing as tolerated/Elevate extremity

## 2023-12-12 NOTE — ASU DISCHARGE PLAN (ADULT/PEDIATRIC) - CARE PROVIDER_API CALL
Adrian Cameron)  Surgery of the Hand  [address], [address]  [place] [address]  Phone: [phone]  Fax: [phone]  Scheduled Appointment: 12/18/2023   Adrian Cameron)  Surgery of the Hand  [address], [address]  [place] [address]  Phone: [phone]  Fax: [phone]  Scheduled Appointment: 12/18/2023

## 2023-12-13 RX ORDER — RIVAROXABAN 15 MG-20MG
1 KIT ORAL
Qty: 0 | Refills: 0 | DISCHARGE
Start: 2023-12-13

## 2023-12-18 ENCOUNTER — APPOINTMENT (OUTPATIENT)
Dept: ORTHOPEDIC SURGERY | Facility: CLINIC | Age: 78
End: 2023-12-18
Payer: MEDICARE

## 2023-12-18 DIAGNOSIS — G56.01 CARPAL TUNNEL SYNDROME, RIGHT UPPER LIMB: ICD-10-CM

## 2023-12-18 PROCEDURE — 99024 POSTOP FOLLOW-UP VISIT: CPT

## 2023-12-18 NOTE — END OF VISIT
[FreeTextEntry3] : This note was written by Silvana Colon on 12/18/2023 acting solely as a scribe for Dr. Adrian Cameron.   All medical record entries made by the Scribe were at my, Dr. Adrian Cameron, direction and personally dictated by me on 12/18/2023. I have personally reviewed the chart and agree that the record accurately reflects my personal performance of the history, physical exam, assessment and plan.

## 2023-12-18 NOTE — HISTORY OF PRESENT ILLNESS
[de-identified] : 6 days postoperative. [de-identified] : 6 days status post endoscopic left carpal tunnel release.  Date of surgery: 12/12/2023.  He is doing well up to this point. He denies any pain and reports decreased tingling compared to preop. He has residual numbness.  [de-identified] : Examination of his left wrist and hand after the dressing was removed demonstrates his incision to be seen and dry.  There is no drainage or evidence of infection.  There is some swelling and ecchymosis.  He has appropriate flexion and extension of the digits.  He has intact sensation to light touch throughout the digits. [de-identified] : Stable, 6 days postoperative. [de-identified] : The suture ends were cut, and Steri-Strips were applied. He was instructed on local wound care, when to begin scar massage and desensitization, range of motion exercises.  He is leaving for [place] within the next week or 2, and he will call me or set up a telehealth visit if he has any problems or concerns in 3 to 4 weeks.

## 2023-12-18 NOTE — ADDENDUM
[FreeTextEntry1] : I, Silvana Colon, acted solely as a scribe for Dr. Cameron on this date on 12/18/2023.

## 2023-12-27 ENCOUNTER — APPOINTMENT (OUTPATIENT)
Dept: MRI IMAGING | Facility: CLINIC | Age: 78
End: 2023-12-27

## 2023-12-27 ENCOUNTER — APPOINTMENT (OUTPATIENT)
Dept: ORTHOPEDIC SURGERY | Facility: CLINIC | Age: 78
End: 2023-12-27
Payer: MEDICARE

## 2023-12-27 ENCOUNTER — APPOINTMENT (OUTPATIENT)
Dept: CARDIOLOGY | Facility: CLINIC | Age: 78
End: 2023-12-27

## 2023-12-27 DIAGNOSIS — S43.422A SPRAIN OF LEFT ROTATOR CUFF CAPSULE, INITIAL ENCOUNTER: ICD-10-CM

## 2023-12-27 PROCEDURE — 73221 MRI JOINT UPR EXTREM W/O DYE: CPT | Mod: LT,MH

## 2023-12-27 PROCEDURE — 73030 X-RAY EXAM OF SHOULDER: CPT | Mod: LT

## 2023-12-27 PROCEDURE — 99203 OFFICE O/P NEW LOW 30 MIN: CPT

## 2023-12-27 NOTE — PHYSICAL EXAM
[3 ___] : forward flexion 3[unfilled]/5 [] : motor and sensory intact distally [Left] : left shoulder [There are no fractures, subluxations or dislocations. No significant abnormalities are seen] : There are no fractures, subluxations or dislocations. No significant abnormalities are seen [TWNoteComboBox7] : active forward flexion 60 degrees

## 2023-12-27 NOTE — DISCUSSION/SUMMARY
[de-identified] : I reviewed patient's left shoulder radiographs and discussed his condition and treatment options with patient and his wife.  Given continued pain and interference with ADLS despite NSAIDs, physical therapy, rest, and HEP, I advised obtaining further imaging.  Based on the results of the MRI, more invasive interventions including injections and surgery may be indicated.  Patient voiced understanding and agreement with the plan.

## 2023-12-27 NOTE — HISTORY OF PRESENT ILLNESS
[de-identified] : Patient presents for LT shoulder pain evaluation. Patient states there is pain in the front and the side of his shoulder for years. Patient states he had a flare up on Sunday and it was extremely painful and swollen. Patient recently had carpal tunnel surgery, so he had a sling, has been wearing that and he said it helped take some relief off of the shoulder. Patient goes to PT for his wrist and his physical therapist worked on his shoulder a bit and states there is some relief. Patient states he takes Ibuprofen as needed. Patient is RHD and limited ROM.

## 2024-01-17 RX ORDER — METOPROLOL SUCCINATE 25 MG/1
25 TABLET, EXTENDED RELEASE ORAL
Qty: 90 | Refills: 3 | Status: ACTIVE | COMMUNITY
Start: 2023-05-16 | End: 1900-01-01

## (undated) DEVICE — DRSG WEBRIL 3"

## (undated) DEVICE — WARMING BLANKET LOWER ADULT

## (undated) DEVICE — POSITIONER STRAP ARMBOARD VELCRO TS-30

## (undated) DEVICE — DRAPE TOWEL BLUE 17" X 24"

## (undated) DEVICE — POSITIONER FOAM HEAD CRADLE (PINK)

## (undated) DEVICE — PACK UPPER EXTREMITY

## (undated) DEVICE — DRSG STERISTRIPS 0.5 X 4"

## (undated) DEVICE — GLV 7.5 PROTEXIS (BLUE)

## (undated) DEVICE — SLING ARM CHIEFTAIN MESH MEDIUM

## (undated) DEVICE — DRSG KLING 4"

## (undated) DEVICE — SLING ARM CHIEFTAIN MESH LARGE

## (undated) DEVICE — SPECIMEN CONTAINER PET

## (undated) DEVICE — SUT MONOCRYL 5-0 18" P-3 UNDYED

## (undated) DEVICE — SYS ENDO STRATOS RELEASE 30 DEG 4MM

## (undated) DEVICE — GLV 7 PROTEXIS (WHITE)

## (undated) DEVICE — DRAPE 3/4 SHEET 52X76"

## (undated) DEVICE — TOURNIQUET CUFF 18" DUAL PORT SINGLE BLADDER W PLC  (BLACK)

## (undated) DEVICE — BLADE CARPAL TUNNEL SNGL

## (undated) DEVICE — VENODYNE/SCD SLEEVE CALF MEDIUM

## (undated) DEVICE — TOURNIQUET ESMARK 4"

## (undated) DEVICE — DRSG KLING 3"

## (undated) DEVICE — NDL HYPO REGULAR BEVEL 25G X 1.5" (BLUE)